# Patient Record
Sex: FEMALE | Race: WHITE
[De-identification: names, ages, dates, MRNs, and addresses within clinical notes are randomized per-mention and may not be internally consistent; named-entity substitution may affect disease eponyms.]

---

## 2022-01-05 ENCOUNTER — HOSPITAL ENCOUNTER (OUTPATIENT)
Age: 42
End: 2022-01-05
Payer: MEDICAID

## 2022-01-05 DIAGNOSIS — Z34.90: Primary | ICD-10-CM

## 2022-01-05 PROCEDURE — 84702 CHORIONIC GONADOTROPIN TEST: CPT

## 2022-01-05 PROCEDURE — 36415 COLL VENOUS BLD VENIPUNCTURE: CPT

## 2023-09-13 ENCOUNTER — TRANSCRIBE ORDERS (OUTPATIENT)
Dept: ADMINISTRATIVE | Facility: HOSPITAL | Age: 43
End: 2023-09-13
Payer: COMMERCIAL

## 2023-09-13 DIAGNOSIS — Z12.31 ENCOUNTER FOR SCREENING MAMMOGRAM FOR MALIGNANT NEOPLASM OF BREAST: Primary | ICD-10-CM

## 2023-09-15 NOTE — PROGRESS NOTES
Subjective:     Encounter Date:09/19/2023    Primary Care Physician: Asia Prasad APRN      Patient ID: Shefali Srivastava is a 43 y.o. female.    Chief Complaint:Establish Care and Edema    PROBLEM LIST:  Lower extremity edema  Tobacco abuse  History of substance abuse  Sober since 2011  Obesity   Surgeries;  Nasal septal surgery        No Known Allergies      Current Outpatient Medications:     metroNIDAZOLE (FLAGYL) 500 MG tablet, Take 1 tablet by mouth 2 (Two) Times a Day., Disp: , Rfl:         History of Present Illness    Patient is a 43-year-old  female who we are seeing today for further evaluation of edema.  She has no previous history of any cardiac issues.  Has not had a previous cardiac evaluation.  She has not been through menopause.  She denies any chest pain, pressure, tightness.  Denies any syncope.  Notes that she was diagnosed with COVID back in August of this year for the first time.  About a year ago patient began to notice some intermittent swelling of her hands and feet.  Her hands would have some associated numbness at times.  Has also had weight gain.  She notes that over the summer this did improve somewhat but in the last 4 to 6 weeks has become worse again.  Her edema tends to improve whenever she is up and moving but is worse whenever she is stagnant and even after sleep.  At times she notes that she will feel some abdominal distention, edema through her legs and edema in her face.  She notes that she drinks a lot of fluid during the day.  She typically eats fast food breakfast 3-4 times per week.  She has not had any previous cardiac testing.  Given her symptoms she was referred here for further evaluation.    The following portions of the patient's history were reviewed and updated as appropriate: allergies, current medications, past family history, past medical history, past social history, past surgical history and problem list.    Family History   Problem Relation Age of  "Onset    Congenital heart disease Daughter        Social History     Tobacco Use    Smoking status: Every Day     Packs/day: 0.50     Types: Cigarettes   Substance Use Topics    Alcohol use: Never    Drug use: Not Currently         Review of Systems   Constitutional: Positive for malaise/fatigue and weight gain. Negative for fever.   HENT:  Negative for nosebleeds.    Eyes:  Negative for redness and visual disturbance.   Cardiovascular:  Positive for leg swelling. Negative for orthopnea, palpitations and paroxysmal nocturnal dyspnea.   Respiratory:  Positive for shortness of breath. Negative for cough, snoring, sputum production and wheezing.    Hematologic/Lymphatic: Negative for bleeding problem.   Skin:  Negative for flushing, itching and rash.   Musculoskeletal:  Negative for falls, joint pain and muscle cramps.   Gastrointestinal:  Positive for bloating. Negative for abdominal pain, diarrhea, heartburn, nausea and vomiting.   Genitourinary:  Negative for hematuria.   Neurological:  Negative for excessive daytime sleepiness, dizziness, headaches, tremors and weakness.   Psychiatric/Behavioral:  Negative for substance abuse. The patient is nervous/anxious.         Objective:   /94   Pulse 94   Ht 160 cm (63\")   Wt 96 kg (211 lb 9.6 oz)   SpO2 97%   BMI 37.48 kg/m²         Vitals reviewed.   Constitutional:       Appearance: Healthy appearance. Well-developed and not in distress.   Eyes:      Conjunctiva/sclera: Conjunctivae normal.      Pupils: Pupils are equal, round, and reactive to light.   HENT:      Head: Normocephalic and atraumatic.    Mouth/Throat:      Pharynx: Oropharynx is clear.   Neck:      Thyroid: Thyroid normal. No thyromegaly.      Vascular: Normal carotid pulses. No carotid bruit or JVD. JVD normal.      Lymphadenopathy: No cervical adenopathy.   Pulmonary:      Effort: No respiratory distress.      Breath sounds: No wheezing. No rales.   Chest:      Chest wall: Not tender to " palpatation.   Cardiovascular:      Normal rate. Regular rhythm.      No gallop.    Pulses:     Carotid: 2+ bilaterally.     Dorsalis pedis: 2+ bilaterally.     Posterior tibial: 2+ bilaterally.  Edema:     Peripheral edema present.     Pretibial: trace edema of the left pretibial area.     Ankle: bilateral trace edema of the ankle.  Abdominal:      General: There is no distension or abdominal bruit.      Palpations: There is no abdominal mass.      Tenderness: There is no abdominal tenderness. There is no rebound.   Musculoskeletal:         General: No tenderness or deformity.      Extremities: No clubbing present.Skin:     General: Skin is warm and dry. There is no cyanosis.      Findings: No rash.   Neurological:      Mental Status: Alert, oriented to person, place, and time and oriented to person, place and time.         ECG 12 Lead    Date/Time: 9/19/2023 11:04 AM  Performed by: Arturo Jacobs MD  Authorized by: Arturo Jacobs MD   Comparison: not compared with previous ECG   Previous ECG: no previous ECG available  Rhythm: sinus rhythm              Assessment:   Assessment & Plan      Diagnoses and all orders for this visit:    1. Bilateral leg edema (Primary)  -     ECG 12 Lead      1.  Peripheral edema.  No evidence of heart failure clinically.  2.  Positive family history of multiple family members with ASD (2 children).    Recommendations:  1.  Check echocardiogram  2.  Furosemide 20 mg daily as needed  3.  Discussed need to decrease sodium and water intake significantly.  4.  Further recommendations after the above       Barbra CAMILO scribed portions of this dictation for Dr. Arturo Jacobs.     I have seen and examined the patient, I have reviewed the note, discussed the case with the advance practice clinician, made necessary changes and I agree with the final note.    Arturo Jacobs MD  09/19/23  13:17 EDT            Dictated utilizing Dragon dictation

## 2023-09-19 ENCOUNTER — OFFICE VISIT (OUTPATIENT)
Dept: CARDIOLOGY | Facility: CLINIC | Age: 43
End: 2023-09-19
Payer: COMMERCIAL

## 2023-09-19 VITALS
WEIGHT: 211.6 LBS | HEIGHT: 63 IN | BODY MASS INDEX: 37.49 KG/M2 | DIASTOLIC BLOOD PRESSURE: 94 MMHG | OXYGEN SATURATION: 97 % | HEART RATE: 94 BPM | SYSTOLIC BLOOD PRESSURE: 151 MMHG

## 2023-09-19 DIAGNOSIS — R60.0 BILATERAL LEG EDEMA: Primary | ICD-10-CM

## 2023-09-19 RX ORDER — FUROSEMIDE 20 MG/1
20 TABLET ORAL DAILY PRN
Qty: 30 TABLET | Refills: 1 | Status: SHIPPED | OUTPATIENT
Start: 2023-09-19

## 2023-09-19 RX ORDER — METRONIDAZOLE 500 MG/1
500 TABLET ORAL 2 TIMES DAILY
COMMUNITY
Start: 2023-09-14

## 2023-09-27 ENCOUNTER — HOSPITAL ENCOUNTER (OUTPATIENT)
Dept: MAMMOGRAPHY | Facility: HOSPITAL | Age: 43
Discharge: HOME | End: 2023-09-27
Admitting: OBSTETRICS & GYNECOLOGY
Payer: COMMERCIAL

## 2023-09-27 DIAGNOSIS — Z12.31 ENCOUNTER FOR SCREENING MAMMOGRAM FOR MALIGNANT NEOPLASM OF BREAST: ICD-10-CM

## 2023-09-27 PROCEDURE — 77067 SCR MAMMO BI INCL CAD: CPT

## 2023-09-27 PROCEDURE — 77063 BREAST TOMOSYNTHESIS BI: CPT
